# Patient Record
Sex: MALE | Race: OTHER | NOT HISPANIC OR LATINO
[De-identification: names, ages, dates, MRNs, and addresses within clinical notes are randomized per-mention and may not be internally consistent; named-entity substitution may affect disease eponyms.]

---

## 2019-03-05 ENCOUNTER — APPOINTMENT (OUTPATIENT)
Dept: NEUROSURGERY | Facility: CLINIC | Age: 34
End: 2019-03-05
Payer: MEDICARE

## 2019-03-05 ENCOUNTER — OUTPATIENT (OUTPATIENT)
Dept: OUTPATIENT SERVICES | Facility: HOSPITAL | Age: 34
LOS: 1 days | End: 2019-03-05
Payer: MEDICARE

## 2019-03-05 VITALS
BODY MASS INDEX: 22.66 KG/M2 | HEIGHT: 61 IN | DIASTOLIC BLOOD PRESSURE: 86 MMHG | WEIGHT: 120 LBS | SYSTOLIC BLOOD PRESSURE: 143 MMHG | TEMPERATURE: 98.2 F | RESPIRATION RATE: 16 BRPM | HEART RATE: 86 BPM | OXYGEN SATURATION: 98 %

## 2019-03-05 DIAGNOSIS — G91.9 HYDROCEPHALUS, UNSPECIFIED: ICD-10-CM

## 2019-03-05 PROCEDURE — 99214 OFFICE O/P EST MOD 30 MIN: CPT

## 2019-03-05 PROCEDURE — 70450 CT HEAD/BRAIN W/O DYE: CPT | Mod: 26

## 2019-03-05 PROCEDURE — 70450 CT HEAD/BRAIN W/O DYE: CPT

## 2019-03-05 NOTE — FAMILY HISTORY
[Alive] : alive [FreeTextEntry1] : 73 [FreeTextEntry2] : 69 [FreeTextEntry3] : 41 [FreeTextEntry7] : 37

## 2019-03-05 NOTE — PHYSICAL EXAM
[General Appearance - Alert] : alert [General Appearance - In No Acute Distress] : in no acute distress [General Appearance - Well Nourished] : well nourished [General Appearance - Well-Appearing] : healthy appearing [Oriented To Time, Place, And Person] : oriented to person, place, and time [Impaired Insight] : insight and judgment were intact [Affect] : the affect was normal [Mood] : the mood was normal [Memory Recent] : recent memory was not impaired [Person] : oriented to person [Place] : oriented to place [Time] : oriented to time [Cranial Nerves Optic (II)] : visual acuity intact bilaterally,  pupils equal round and reactive to light [Cranial Nerves Oculomotor (III)] : extraocular motion intact [Cranial Nerves Trigeminal (V)] : facial sensation intact symmetrically [Cranial Nerves Facial (VII)] : face symmetrical [Cranial Nerves Vestibulocochlear (VIII)] : hearing was intact bilaterally [Cranial Nerves Glossopharyngeal (IX)] : tongue and palate midline [Cranial Nerves Accessory (XI - Cranial And Spinal)] : head turning and shoulder shrug symmetric [Cranial Nerves Hypoglossal (XII)] : there was no tongue deviation with protrusion [Sclera] : the sclera and conjunctiva were normal [PERRL With Normal Accommodation] : pupils were equal in size, round, reactive to light, with normal accommodation [Extraocular Movements] : extraocular movements were intact [Outer Ear] : the ears and nose were normal in appearance [Hearing Threshold Finger Rub Not [place]] : hearing was normal [Oropharynx] : the oropharynx was normal [Neck Appearance] : the appearance of the neck was normal [Exaggerated Use Of Accessory Muscles For Inspiration] : no accessory muscle use [Heart Rate And Rhythm] : heart rate was normal and rhythm regular [Skin Color & Pigmentation] : normal skin color and pigmentation [] : no rash [Well-Healed] : well-healed [FreeTextEntry6] : Wheelchair bound [FreeTextEntry7] : Decreased below the knees. [FreeTextEntry8] : wheelchair bound [FreeTextEntry1] : wheelchair bound

## 2019-03-05 NOTE — ASSESSMENT
[FreeTextEntry1] : IMPRESSION:\par Almost 6 years post placement of a new right parieto-occipital  shunt\par Doing very well clinically with no significant shunt-related symptoms\par Spina bifida with paraplegia\par \par \par PLAN:\par 1. CT head w/o contrast.\par 2. F/U with PCP.\par 3. Follow-up in 1 year and PRN.\par \par Aram Samuels MD, FACS, FAANS\par Professor and \par Department of Neurosurgery\par [facility] [employer] [facility]\par [address], [address]\[place] [address]\par [phone] Clinical\par [phone] Academic\par \par NOTE: the CT head obtained today looks good - ventricles are small and there is no hematoma or collection.\par \par RICKEY

## 2019-03-05 NOTE — HISTORY OF PRESENT ILLNESS
[FreeTextEntry1] : JERMAINE BARRERA is a pleasant 34 year old right handed gentleman of  origin being seen for a general follow-up visit  Patient was alone. \par \par Today he presents via wheelchair in good spirits and states that he came via [employer].  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 In June, 2013 a new Codman VPS was placed with a siphon valve with setting at 150. At a subsequent visit, 9/5/13, VPS was recalibrated to 110 to help resolve patients headaches. \par \par Today patient states that he feels great and denies headaches.  He reported occasional sensation of "pressure" in his head when bearing down when using the bathroom.  Head pressure is associated with slight blurry vision that resolves after bearing down. He has not had any VPS revisions since Sept. 2013. He denies any headaches, visual field changes, or other sensorimotor disturbances. \par \par His PCP is Dr. Morgan Concepcion, [address] [place] [address] .\par

## 2022-09-28 ENCOUNTER — APPOINTMENT (OUTPATIENT)
Dept: ORTHOPEDIC SURGERY | Facility: CLINIC | Age: 37
End: 2022-09-28

## 2022-09-28 PROCEDURE — 99204 OFFICE O/P NEW MOD 45 MIN: CPT

## 2022-09-28 PROCEDURE — 72050 X-RAY EXAM NECK SPINE 4/5VWS: CPT

## 2022-09-28 PROCEDURE — 73030 X-RAY EXAM OF SHOULDER: CPT | Mod: LT

## 2022-09-28 NOTE — DISCUSSION/SUMMARY
[de-identified] : Patient and I discussed their symptoms, LT shoulder pain . Discussed findings of today's exam and possible causes of patient's pain. 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 Reviewed possible courses of treatment, and we collaboratively decided best course of treatment at this time will include:\par \par 1. MRI LT shoulder\par 2. Start PT\par 3. OTC NSAIDs prn \par 4. Topical diclofenac\par \par Instructions: Dx / Natural History\par The patient was advised of the diagnosis.  The natural history of the pathology was explained in full to the patient in layman's terms.  Several different treatment options were discussed and explained in full to the patient including the risks and benefits of both surgical and non-surgical treatments.  All questions and concerns were answered. \par \par RICE\par I explained to the patient that rest, ice, compression, and elevation would benefit them.  They may return to activity after follow-up or when they no longer have any pain.\par \par NSAIDs - OTC\par Patient is to begin over the counter oral anti-inflammatory medications on an as needed basis, as long as there are no medical contraindications.  Patient is counseled on possible GI and blood pressure side effects.\par \par Pain Guide Activities\par The patient was advised to let pain guide the gradual advancement of activities.\par \par Icing\par The patient was advised to apply ice (wrapped in a towel or protective covering) to the area daily (20 minutes at a time, 2-4X/day).\par \par Follow up after MRI \par \par All of the patient's questions were answered to His satisfaction. Diagnoses and potential treatments were reviewed. He agreed with the plan and would like to move forward with it. \par \par History, physical exam, imaging, assessment and plan documented by Mich Zuluaga. The documentation recorded by the scribe accurately reflects the service I, Tate Aj MD, personally performed and the decisions made by me.

## 2022-09-28 NOTE — HISTORY OF PRESENT ILLNESS
[Gradual] : gradual [Dull/Aching] : dull/aching [Radiating] : radiating [Tingling] : tingling [Intermittent] : intermittent [Nothing helps with pain getting better] : Nothing helps with pain getting better [de-identified] : Sep 28, 2022 \par \par JERMAINE is here today as a new patient for LT shoulder pain. PT is paraplegic, uses wheelchair. LT shoulder pain worsens when using wheelchair, pain when turning to LT. Pt not taking NSAIDs, no injections, not doing PT. Pain worse when trying to sleep. Pt denies acute trauma, no n/t radiating to hand on affected side. Pain and numbness radiates down LT UE. [] : no [FreeTextEntry1] : left shoulder  [FreeTextEntry5] : pt states pain in left shoulder for a few years. radiating pain from shoulder to elbow. pt believes it is due to rolling wheelchair. denies injury/trauma to shoulder.  [FreeTextEntry7] : left shoulder to elbow  [de-identified] : wheeling chair

## 2022-09-28 NOTE — IMAGING
[No bony abnormalities] : No bony abnormalities [Left] : left shoulder [There are no fractures, subluxations or dislocations. No significant abnormalities are seen] : There are no fractures, subluxations or dislocations. No significant abnormalities are seen [Type 2 acromion] : Type 2 acromion [AC Joint Arthrosis] : AC Joint Arthrosis [de-identified] : The patient is a well appearing 37 year year old male of their stated age.\par Neck is supple & nontender to palpation. Positive Spurling's test to LT.\par \par General: in no acute distress, seated comfortably, moving easily\par Skin: No discoloration, rashes; on palpation skin is dry, \par Neuro: Normal sensation all dermatomes, motor all myotomes\par Vascular: Normal pulses, no edema, normal temperature\par Coordination and balance: Normal\par Psych: normal mood and affect, non pressured speech, alert and oriented x3\par \par Effected Shoulder \par Inspection:\par Scapula Winging: Negative\par Deformity: None\par Erythema: None\par Ecchymosis: None\par Abrasions: None\par Effusion: None\par \par Range of Motion:\par Active Forward Flexion: 160 degrees \par Passive Forward Flexion: 170 degrees \par Active IR : L4\par Passive ER : 30 degrees\par \par Motor Exam:\par Forward Flexion: 4+ out of 5\par Flexion Plane of Scapula: 5 out of 5\par Abduction: 4+ out of 5\par Internal Rotation: 5 out of 5\par External Rotation: 4+ out of 5\par Distal Motor Strength: 5 out of 5\par \par Stability Testing:\par Anterior: 1+\par Posterior: 1+\par Sulcus N: 1+\par Sulcus ER: 1+\par \par Provocative Tests:\par Drop Arm: Negative\par Maxwell/Impingement: Positive\par [place]: Positive\par X-Arm Adduction: Negative\par Belly Press: Negative\par Bear Hug: Negative\par Lift Off: Negative\par Apprehension: Negative\par Relocation: Negative\par Posterior Load & Shift: Negative\par \par Palpation:\par AC Joint: Nontender\par Clavicle: Nontender\par SC Joint: Nontender\par Bicepital Groove: Positive\par Coracoid Process: Nontender\par Pectoralis Minor Tendon: Nontender\par Pectoralis Major Tendon: Nontender & palpably intact\par Latissimus Dorsi: Nontender \par Proximal Humerus: Positive\par Scapula Body: Nontender\par Medial Scapula Boarder: Nontender\par Scapula Spine: Nontender\par \par Neurologic Exam: Sensation to Light Touch:\par Axillary: Grossly intact\par Ulnar: Grossly intact\par Radial: Grossly intact\par Median: Grossly intact\par Other:  N/A\par \par Circulatory/Pulses:\par Ulnar: 2+\par Radial: 2+\par Other Pertinent Findings: None\par \par Contralateral Shoulder\par Range of Motion:\par Active Forward Flexion: 180 degrees \par Active Abduction: 180 degrees \par Passive Forward Flexion: 180 degrees \par Passive Abduction: 180 degrees \par ER @ 90 degrees: 90 degrees\par IR @ 90 degrees: 45 degrees\par ER @ 0 degrees: 50 degrees\par \par Motor Exam:\par Forward Flexion: 5 out of 5\par Flexion Plane of Scapula: 5 out of 5\par Abduction: 5 out of 5\par Internal Rotation: 5 out of 5\par External Rotation: 5 out of 5\par Distal Motor Strength: 5 out of 5\par \par Stability Testing:\par Anterior: 1+\par Posterior: 1+\par Sulcus N: 1+\par Sulcus ER: 1+\par \par Other Pertinent Findings: None\par

## 2022-10-27 ENCOUNTER — RX RENEWAL (OUTPATIENT)
Age: 37
End: 2022-10-27

## 2022-10-27 RX ORDER — DICLOFENAC SODIUM 1% 10 MG/G
1 GEL TOPICAL DAILY
Qty: 100 | Refills: 0 | Status: ACTIVE | COMMUNITY
Start: 2022-09-28 | End: 1900-01-01

## 2023-02-24 ENCOUNTER — APPOINTMENT (OUTPATIENT)
Dept: MRI IMAGING | Facility: IMAGING CENTER | Age: 38
End: 2023-02-24

## 2023-08-11 ENCOUNTER — APPOINTMENT (OUTPATIENT)
Dept: ORTHOPEDIC SURGERY | Facility: CLINIC | Age: 38
End: 2023-08-11
Payer: COMMERCIAL

## 2023-08-11 VITALS — BODY MASS INDEX: 22.66 KG/M2 | HEIGHT: 61 IN | WEIGHT: 120 LBS

## 2023-08-11 DIAGNOSIS — M25.512 PAIN IN LEFT SHOULDER: ICD-10-CM

## 2023-08-11 DIAGNOSIS — M54.12 RADICULOPATHY, CERVICAL REGION: ICD-10-CM

## 2023-08-11 DIAGNOSIS — M19.012 PRIMARY OSTEOARTHRITIS, LEFT SHOULDER: ICD-10-CM

## 2023-08-11 PROCEDURE — 99214 OFFICE O/P EST MOD 30 MIN: CPT

## 2023-08-11 RX ORDER — DICLOFENAC SODIUM 75 MG/1
75 TABLET, DELAYED RELEASE ORAL TWICE DAILY
Qty: 30 | Refills: 0 | Status: ACTIVE | COMMUNITY
Start: 2023-08-11 | End: 1900-01-01

## 2023-08-11 NOTE — DISCUSSION/SUMMARY
[de-identified] : Patient and I discussed their symptoms, LT shoulder pain . Discussed findings of today's exam and possible causes of patient's pain. 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 Reviewed possible courses of treatment, and we collaboratively decided best course of treatment at this time will include:  1. CT arthrogram to eval for RTC tear 2. Diclofenac rx  Discussed possibility of future injections  Instructions: Dx / Natural History The patient was advised of the diagnosis.  The natural history of the pathology was explained in full to the patient in layman's terms.  Several different treatment options were discussed and explained in full to the patient including the risks and benefits of both surgical and non-surgical treatments.  All questions and concerns were answered.   RICE I explained to the patient that rest, ice, compression, and elevation would benefit them.  They may return to activity after follow-up or when they no longer have any pain.  NSAIDs - OTC Patient is to begin over the counter oral anti-inflammatory medications on an as needed basis, as long as there are no medical contraindications.  Patient is counseled on possible GI and blood pressure side effects.  Pain Guide Activities The patient was advised to let pain guide the gradual advancement of activities.  Icing The patient was advised to apply ice (wrapped in a towel or protective covering) to the area daily (20 minutes at a time, 2-4X/day).  Follow up after CT arthrogram   All of the patient's questions were answered to His satisfaction. Diagnoses and potential treatments were reviewed. He agreed with the plan and would like to move forward with it.   History, physical exam, imaging, assessment and plan documented by Xiao Sanders. The documentation recorded by the scribe accurately reflects the service I, Tate Aj MD, personally performed and the decisions made by me.

## 2023-08-11 NOTE — HISTORY OF PRESENT ILLNESS
[Gradual] : gradual [8] : 8 [5] : 5 [Dull/Aching] : dull/aching [Radiating] : radiating [Tingling] : tingling [Intermittent] : intermittent [Nothing helps with pain getting better] : Nothing helps with pain getting better [de-identified] : 8/11/23: here to f/u left shoulder, reports pain has worsened.   Sep 28, 2022   JERMAINE is here today as a new patient for LT shoulder pain. PT is paraplegic, uses wheelchair. LT shoulder pain worsens when using wheelchair, pain when turning to LT. Pt not taking NSAIDs, no injections, not doing PT. Pain worse when trying to sleep. Pt denies acute trauma, no n/t radiating to hand on affected side. Pain and numbness radiates down LT UE. [] : no [FreeTextEntry1] : left shoulder  [FreeTextEntry5] : pt states pain in left shoulder for a few years. radiating pain from shoulder to elbow. pt believes it is due to rolling wheelchair. denies injury/trauma to shoulder. 8/11/2023- pt is here today for a follow up of is left shoulder , pain is worse  [FreeTextEntry7] : left shoulder to elbow  [de-identified] : wheeling chair

## 2023-08-11 NOTE — IMAGING
[No bony abnormalities] : No bony abnormalities [Left] : left shoulder [There are no fractures, subluxations or dislocations. No significant abnormalities are seen] : There are no fractures, subluxations or dislocations. No significant abnormalities are seen [Type 2 acromion] : Type 2 acromion [AC Joint Arthrosis] : AC Joint Arthrosis [de-identified] : The patient is a well appearing 37 year year old male of their stated age.\par  Neck is supple & nontender to palpation. Positive Spurling's test to LT.\par  \par  General: in no acute distress, seated comfortably, moving easily\par  Skin: No discoloration, rashes; on palpation skin is dry, \par  Neuro: Normal sensation all dermatomes, motor all myotomes\par  Vascular: Normal pulses, no edema, normal temperature\par  Coordination and balance: Normal\par  Psych: normal mood and affect, non pressured speech, alert and oriented x3\par  \par  Effected Shoulder \par  Inspection:\par  Scapula Winging: Negative\par  Deformity: None\par  Erythema: None\par  Ecchymosis: None\par  Abrasions: None\par  Effusion: None\par  \par  Range of Motion:\par  Active Forward Flexion: 160 degrees \par  Passive Forward Flexion: 170 degrees \par  Active IR : L4\par  Passive ER : 30 degrees\par  \par  Motor Exam:\par  Forward Flexion: 4+ out of 5\par  Flexion Plane of Scapula: 5 out of 5\par  Abduction: 4+ out of 5\par  Internal Rotation: 5 out of 5\par  External Rotation: 4+ out of 5\par  Distal Motor Strength: 5 out of 5\par  \par  Stability Testing:\par  Anterior: 1+\par  Posterior: 1+\par  Sulcus N: 1+\par  Sulcus ER: 1+\par  \par  Provocative Tests:\par  Drop Arm: Negative\par  Maxwell/Impingement: Positive\par  [place]: Positive\par  X-Arm Adduction: Negative\par  Belly Press: Negative\par  Bear Hug: Negative\par  Lift Off: Negative\par  Apprehension: Negative\par  Relocation: Negative\par  Posterior Load & Shift: Negative\par  \par  Palpation:\par  AC Joint: Nontender\par  Clavicle: Nontender\par  SC Joint: Nontender\par  Bicepital Groove: Positive\par  Coracoid Process: Nontender\par  Pectoralis Minor Tendon: Nontender\par  Pectoralis Major Tendon: Nontender & palpably intact\par  Latissimus Dorsi: Nontender \par  Proximal Humerus: Positive\par  Scapula Body: Nontender\par  Medial Scapula Boarder: Nontender\par  Scapula Spine: Nontender\par  \par  Neurologic Exam: Sensation to Light Touch:\par  Axillary: Grossly intact\par  Ulnar: Grossly intact\par  Radial: Grossly intact\par  Median: Grossly intact\par  Other:  N/A\par  \par  Circulatory/Pulses:\par  Ulnar: 2+\par  Radial: 2+\par  Other Pertinent Findings: None\par  \par  Contralateral Shoulder\par  Range of Motion:\par  Active Forward Flexion: 180 degrees \par  Active Abduction: 180 degrees \par  Passive Forward Flexion: 180 degrees \par  Passive Abduction: 180 degrees \par  ER @ 90 degrees: 90 degrees\par  IR @ 90 degrees: 45 degrees\par  ER @ 0 degrees: 50 degrees\par  \par  Motor Exam:\par  Forward Flexion: 5 out of 5\par  Flexion Plane of Scapula: 5 out of 5\par  Abduction: 5 out of 5\par  Internal Rotation: 5 out of 5\par  External Rotation: 5 out of 5\par  Distal Motor Strength: 5 out of 5\par  \par  Stability Testing:\par  Anterior: 1+\par  Posterior: 1+\par  Sulcus N: 1+\par  Sulcus ER: 1+\par  \par  Other Pertinent Findings: None\par

## 2023-08-22 ENCOUNTER — APPOINTMENT (OUTPATIENT)
Dept: ORTHOPEDIC SURGERY | Facility: HOSPITAL | Age: 38
End: 2023-08-22
Payer: COMMERCIAL

## 2023-08-22 ENCOUNTER — APPOINTMENT (OUTPATIENT)
Dept: ORTHOPEDIC SURGERY | Facility: HOSPITAL | Age: 38
End: 2023-08-22

## 2023-08-22 PROCEDURE — 99203 OFFICE O/P NEW LOW 30 MIN: CPT | Mod: 95

## 2023-09-01 ENCOUNTER — APPOINTMENT (OUTPATIENT)
Dept: ORTHOPEDIC SURGERY | Facility: CLINIC | Age: 38
End: 2023-09-01
Payer: COMMERCIAL

## 2023-09-01 DIAGNOSIS — S43.432A SUPERIOR GLENOID LABRUM LESION OF LEFT SHOULDER, INITIAL ENCOUNTER: ICD-10-CM

## 2023-09-01 PROCEDURE — 99214 OFFICE O/P EST MOD 30 MIN: CPT

## 2023-09-01 NOTE — HISTORY OF PRESENT ILLNESS
[Gradual] : gradual [8] : 8 [5] : 5 [Dull/Aching] : dull/aching [Radiating] : radiating [Tingling] : tingling [Intermittent] : intermittent [Nothing helps with pain getting better] : Nothing helps with pain getting better [de-identified] : 9/1/23: here to f/u left shoulder and review CT results 8/11/23: here to f/u left shoulder, reports pain has worsened.   Sep 28, 2022   JERMAINE is here today as a new patient for LT shoulder pain. PT is paraplegic, uses wheelchair. LT shoulder pain worsens when using wheelchair, pain when turning to LT. Pt not taking NSAIDs, no injections, not doing PT. Pain worse when trying to sleep. Pt denies acute trauma, no n/t radiating to hand on affected side. Pain and numbness radiates down LT UE. [] : no [FreeTextEntry1] : left shoulder  [FreeTextEntry5] : JERMAINE 38 year old M here for  [FreeTextEntry7] : left shoulder to elbow  [de-identified] : wheeling chair

## 2023-09-01 NOTE — DISCUSSION/SUMMARY
[de-identified] : Patient and I discussed their symptoms, LT shoulder pain . Discussed findings of today's exam and possible causes of patient's pain. Educated patient on their most probable diagnosis of LT glenoid labral tear. Reviewed possible courses of treatment, and we collaboratively decided best course of treatment at this time will include:  1. Start PT 2. Discussed possibility of future injections if pain persists  Instructions: Dx / Natural History The patient was advised of the diagnosis.  The natural history of the pathology was explained in full to the patient in layman's terms.  Several different treatment options were discussed and explained in full to the patient including the risks and benefits of both surgical and non-surgical treatments.  All questions and concerns were answered.   RICE I explained to the patient that rest, ice, compression, and elevation would benefit them.  They may return to activity after follow-up or when they no longer have any pain.  NSAIDs - OTC Patient is to begin over the counter oral anti-inflammatory medications on an as needed basis, as long as there are no medical contraindications.  Patient is counseled on possible GI and blood pressure side effects.  Pain Guide Activities The patient was advised to let pain guide the gradual advancement of activities.  Icing The patient was advised to apply ice (wrapped in a towel or protective covering) to the area daily (20 minutes at a time, 2-4X/day).  Follow up 2 months.   All of the patient's questions were answered to His satisfaction. Diagnoses and potential treatments were reviewed. He agreed with the plan and would like to move forward with it.   History, physical exam, imaging, assessment and plan documented by Xiao Sanders. The documentation recorded by the scribe accurately reflects the service I, Tate Aj MD, personally performed and the decisions made by me.

## 2023-09-01 NOTE — IMAGING
[No bony abnormalities] : No bony abnormalities [Left] : left shoulder [There are no fractures, subluxations or dislocations. No significant abnormalities are seen] : There are no fractures, subluxations or dislocations. No significant abnormalities are seen [Type 2 acromion] : Type 2 acromion [AC Joint Arthrosis] : AC Joint Arthrosis [de-identified] : The patient is a well appearing 37 year year old male of their stated age.\par  Neck is supple & nontender to palpation. Positive Spurling's test to LT.\par  \par  General: in no acute distress, seated comfortably, moving easily\par  Skin: No discoloration, rashes; on palpation skin is dry, \par  Neuro: Normal sensation all dermatomes, motor all myotomes\par  Vascular: Normal pulses, no edema, normal temperature\par  Coordination and balance: Normal\par  Psych: normal mood and affect, non pressured speech, alert and oriented x3\par  \par  Effected Shoulder \par  Inspection:\par  Scapula Winging: Negative\par  Deformity: None\par  Erythema: None\par  Ecchymosis: None\par  Abrasions: None\par  Effusion: None\par  \par  Range of Motion:\par  Active Forward Flexion: 160 degrees \par  Passive Forward Flexion: 170 degrees \par  Active IR : L4\par  Passive ER : 30 degrees\par  \par  Motor Exam:\par  Forward Flexion: 4+ out of 5\par  Flexion Plane of Scapula: 5 out of 5\par  Abduction: 4+ out of 5\par  Internal Rotation: 5 out of 5\par  External Rotation: 4+ out of 5\par  Distal Motor Strength: 5 out of 5\par  \par  Stability Testing:\par  Anterior: 1+\par  Posterior: 1+\par  Sulcus N: 1+\par  Sulcus ER: 1+\par  \par  Provocative Tests:\par  Drop Arm: Negative\par  Maxwell/Impingement: Positive\par  [place]: Positive\par  X-Arm Adduction: Negative\par  Belly Press: Negative\par  Bear Hug: Negative\par  Lift Off: Negative\par  Apprehension: Negative\par  Relocation: Negative\par  Posterior Load & Shift: Negative\par  \par  Palpation:\par  AC Joint: Nontender\par  Clavicle: Nontender\par  SC Joint: Nontender\par  Bicepital Groove: Positive\par  Coracoid Process: Nontender\par  Pectoralis Minor Tendon: Nontender\par  Pectoralis Major Tendon: Nontender & palpably intact\par  Latissimus Dorsi: Nontender \par  Proximal Humerus: Positive\par  Scapula Body: Nontender\par  Medial Scapula Boarder: Nontender\par  Scapula Spine: Nontender\par  \par  Neurologic Exam: Sensation to Light Touch:\par  Axillary: Grossly intact\par  Ulnar: Grossly intact\par  Radial: Grossly intact\par  Median: Grossly intact\par  Other:  N/A\par  \par  Circulatory/Pulses:\par  Ulnar: 2+\par  Radial: 2+\par  Other Pertinent Findings: None\par  \par  Contralateral Shoulder\par  Range of Motion:\par  Active Forward Flexion: 180 degrees \par  Active Abduction: 180 degrees \par  Passive Forward Flexion: 180 degrees \par  Passive Abduction: 180 degrees \par  ER @ 90 degrees: 90 degrees\par  IR @ 90 degrees: 45 degrees\par  ER @ 0 degrees: 50 degrees\par  \par  Motor Exam:\par  Forward Flexion: 5 out of 5\par  Flexion Plane of Scapula: 5 out of 5\par  Abduction: 5 out of 5\par  Internal Rotation: 5 out of 5\par  External Rotation: 5 out of 5\par  Distal Motor Strength: 5 out of 5\par  \par  Stability Testing:\par  Anterior: 1+\par  Posterior: 1+\par  Sulcus N: 1+\par  Sulcus ER: 1+\par  \par  Other Pertinent Findings: None\par

## 2023-09-01 NOTE — DATA REVIEWED
[CT Scan] : CT scan [Left] : left [Shoulder] : shoulder [Report was reviewed and noted in the chart] : The report was reviewed and noted in the chart [I independently reviewed and interpreted images and report] : I independently reviewed and interpreted images and report [I reviewed the films/CD and agree] : I reviewed the films/CD and agree [FreeTextEntry1] : CT IMPRESSION 8/17/23  1. Type II SLAP tear

## 2023-11-01 ENCOUNTER — APPOINTMENT (OUTPATIENT)
Dept: ORTHOPEDIC SURGERY | Facility: CLINIC | Age: 38
End: 2023-11-01
Payer: COMMERCIAL

## 2023-11-01 PROCEDURE — 99443: CPT
